# Patient Record
Sex: MALE | Race: WHITE | NOT HISPANIC OR LATINO | Employment: OTHER | URBAN - METROPOLITAN AREA
[De-identification: names, ages, dates, MRNs, and addresses within clinical notes are randomized per-mention and may not be internally consistent; named-entity substitution may affect disease eponyms.]

---

## 2019-04-05 NOTE — PATIENT DISCUSSION
OCT is showing loss rnfl OD but longstanding per pt. Disc would like to follow closely and poss Alph P BID OD with any futher loss of RNFL. IOP is great today.

## 2019-11-12 ENCOUNTER — NEW PATIENT COMPREHENSIVE (OUTPATIENT)
Dept: URBAN - METROPOLITAN AREA CLINIC 36 | Facility: CLINIC | Age: 69
End: 2019-11-12

## 2019-11-12 DIAGNOSIS — H35.362: ICD-10-CM

## 2019-11-12 DIAGNOSIS — H01.006: ICD-10-CM

## 2019-11-12 DIAGNOSIS — H01.003: ICD-10-CM

## 2019-11-12 DIAGNOSIS — H54.7: ICD-10-CM

## 2019-11-12 DIAGNOSIS — H04.123: ICD-10-CM

## 2019-11-12 DIAGNOSIS — H25.812: ICD-10-CM

## 2019-11-12 DIAGNOSIS — H25.811: ICD-10-CM

## 2019-11-12 PROCEDURE — 92134 CPTRZ OPH DX IMG PST SGM RTA: CPT

## 2019-11-12 PROCEDURE — 92015 DETERMINE REFRACTIVE STATE: CPT

## 2019-11-12 PROCEDURE — 99204 OFFICE O/P NEW MOD 45 MIN: CPT

## 2019-11-12 PROCEDURE — 92025NC COMP. CORNEAL TOPO, UNI OR BILAT,

## 2019-11-12 RX ORDER — LIFITEGRAST 50 MG/ML: 1 SOLUTION/ DROPS OPHTHALMIC TWICE A DAY

## 2019-11-12 RX ORDER — ERYTHROMYCIN 5 MG/G: OINTMENT OPHTHALMIC

## 2019-11-12 ASSESSMENT — KERATOMETRY
OD_AXISANGLE_DEGREES: 112
OS_K1POWER_DIOPTERS: 45.75
OD_K2POWER_DIOPTERS: 48.75
OS_AXISANGLE_DEGREES: 69
OD_AXISANGLE2_DEGREES: 22
OS_K2POWER_DIOPTERS: 48.75
OD_K1POWER_DIOPTERS: 45
OS_AXISANGLE2_DEGREES: 159

## 2019-11-12 ASSESSMENT — VISUAL ACUITY
OS_SC: CF 6FT
OU_CC: J12
OU_CC: 20/40-1
OD_CC: J16
OS_BAT: 20/100
OS_CC: 20/40-1
OS_SC: J6
OS_CC: J12
OD_SC: 20/800
OD_SC: CF 3FT
OU_SC: J6
OU_SC: CF 6FT
OD_CC: CF 5FT

## 2019-11-12 ASSESSMENT — TONOMETRY
OS_IOP_MMHG: 20
OD_IOP_MMHG: 21

## 2019-11-19 ENCOUNTER — RETINA CONSULT (OUTPATIENT)
Dept: URBAN - METROPOLITAN AREA CLINIC 36 | Facility: CLINIC | Age: 69
End: 2019-11-19

## 2019-11-19 DIAGNOSIS — H54.7: ICD-10-CM

## 2019-11-19 DIAGNOSIS — H35.3122: ICD-10-CM

## 2019-11-19 DIAGNOSIS — H35.362: ICD-10-CM

## 2019-11-19 DIAGNOSIS — H52.223: ICD-10-CM

## 2019-11-19 DIAGNOSIS — H01.003: ICD-10-CM

## 2019-11-19 DIAGNOSIS — H35.722: ICD-10-CM

## 2019-11-19 DIAGNOSIS — D31.31: ICD-10-CM

## 2019-11-19 DIAGNOSIS — H01.006: ICD-10-CM

## 2019-11-19 DIAGNOSIS — H04.123: ICD-10-CM

## 2019-11-19 PROCEDURE — 92014 COMPRE OPH EXAM EST PT 1/>: CPT

## 2019-11-19 PROCEDURE — 92134 CPTRZ OPH DX IMG PST SGM RTA: CPT

## 2019-11-19 PROCEDURE — 9222550 BILAT EXTENDED OPHTHALMOSCOPY, FIRST

## 2019-11-19 ASSESSMENT — VISUAL ACUITY
OD_CC: CF 3FT
OS_CC: 20/40

## 2019-11-19 ASSESSMENT — TONOMETRY
OS_IOP_MMHG: 23
OD_IOP_MMHG: 23

## 2019-12-04 ENCOUNTER — ESTABLISHED PATIENT (OUTPATIENT)
Dept: URBAN - METROPOLITAN AREA CLINIC 43 | Facility: CLINIC | Age: 69
End: 2019-12-04

## 2019-12-04 DIAGNOSIS — H35.723: ICD-10-CM

## 2019-12-04 DIAGNOSIS — H35.3132: ICD-10-CM

## 2019-12-04 PROCEDURE — 92235 FLUORESCEIN ANGRPH MLTIFRAME: CPT

## 2019-12-04 PROCEDURE — 92012 INTRM OPH EXAM EST PATIENT: CPT

## 2019-12-04 PROCEDURE — 92134 CPTRZ OPH DX IMG PST SGM RTA: CPT

## 2019-12-04 PROCEDURE — 92250 FUNDUS PHOTOGRAPHY W/I&R: CPT

## 2019-12-04 ASSESSMENT — VISUAL ACUITY
OD_CC: CF 3FT
OS_CC: 20/50-2

## 2019-12-04 ASSESSMENT — TONOMETRY
OD_IOP_MMHG: 21
OS_IOP_MMHG: 21

## 2020-09-30 ENCOUNTER — ESTABLISHED PATIENT (OUTPATIENT)
Dept: URBAN - METROPOLITAN AREA CLINIC 36 | Facility: CLINIC | Age: 70
End: 2020-09-30

## 2020-09-30 DIAGNOSIS — H35.30: ICD-10-CM

## 2020-09-30 DIAGNOSIS — D31.31: ICD-10-CM

## 2020-09-30 DIAGNOSIS — H35.723: ICD-10-CM

## 2020-09-30 DIAGNOSIS — H35.3122: ICD-10-CM

## 2020-09-30 DIAGNOSIS — H35.3112: ICD-10-CM

## 2020-09-30 DIAGNOSIS — H35.363: ICD-10-CM

## 2020-09-30 PROCEDURE — 92014 COMPRE OPH EXAM EST PT 1/>: CPT

## 2020-09-30 PROCEDURE — 92235 FLUORESCEIN ANGRPH MLTIFRAME: CPT

## 2020-09-30 PROCEDURE — 92134 CPTRZ OPH DX IMG PST SGM RTA: CPT

## 2020-09-30 PROCEDURE — 92250 FUNDUS PHOTOGRAPHY W/I&R: CPT

## 2020-09-30 ASSESSMENT — TONOMETRY
OS_IOP_MMHG: 23
OD_IOP_MMHG: 23

## 2020-09-30 ASSESSMENT — VISUAL ACUITY
OD_CC: CF 2FT
OS_CC: 20/40-1

## 2020-10-19 NOTE — PATIENT DISCUSSION
Patient educated they are not a candidate for Advanced Vision because of high risk glaucoma suspect.

## 2020-10-19 NOTE — PATIENT DISCUSSION
Patient educated they are not a candidate for Advanced Vision because of amblyopia/high risk glaucoma suspect.

## 2021-10-08 NOTE — PATIENT DISCUSSION
PT STATES HE'S BEEN GETTING RADIATION IN OD THAT ENDED LAST MONTH. PT HAD 19 TREATMENTS. WILL MONITOR W/GEH OR RETINA TO MAKE SURE NO RADIATION RETINOPATHY IS PRESENT.

## 2021-10-08 NOTE — PATIENT DISCUSSION
PLAN: YAG OS ONLY AT THIS TIME, P.O W/GEH. WANTS IT DONE IN NOVEMBER. WILL SIGN CONSENTS DAY OF YAG.

## 2021-11-29 NOTE — PATIENT DISCUSSION
CATARACT SURGERY PLANNED FOR TODAY - OD.
Discussed condition and exacerbating conditions/situations (e.g., dry/arid environments, overhead fans, air conditioners, side effect of medications).
Discussed importance of compliance with ocular medications and follow up exams to prevent loss of vision.
Discussed lid hygiene, warm compress and eyelid wash.
Discussed the importance of blood sugar control in the prevention of ocular complications.
IMPRIMIS.
Monitor yearly for diabetic eye disease.
Monitor.
No Retinal holes, Tears or Detachments.
Patient educated they are not a candidate for Advanced Vision because of amblyopia/high risk glaucoma suspect.
Patient understands condition, prognosis and need for follow up care.
Post op gtt instructions reviewed with patient.
Recommend Basic or Basic plus . Understands may need prism in glasses rx after surgery.
Recommended artificial tears to use: 1 drop 4x a day in both eyes.
Recommended yearly dilated eye examinations.
Retinal tear and detachment warning symptoms reviewed and patient instructed to call immediately if increasing floaters, flashes, or decreasing peripheral vision.
Reviewed natural history.
Stable.
The IOP is in the target range. The patient will continue the current management.
The patient feels that the cataract is significantly impacting daily activities and has elected cataract surgery. The risks, benefits, and alternatives to surgery were discussed. The patient elects to proceed with surgery.
pt denied any diplopia. No prism at this time.
Unknown

## 2021-12-13 NOTE — PROCEDURE NOTE: SURGICAL
<p>Prior to commencing surgery patient identification, surgical procedure, site, and side were confirmed by Dr. David Koenig. Following topical proparacaine anesthesia, the patient was positioned at the YAG laser, a contact lens coupled to the cornea with methylcellulose and an axial posterior capsulotomy performed without complication using 2.9 Mj x 20. Excess methylcellulose was washed from the eye, one drop of Alphagan was instilled and the patient returned to the holding area having tolerated the procedure well and without complication. </p><p>MRN: [id number]</p>

## 2024-04-25 NOTE — PATIENT DISCUSSION
Discussed the risks/benefits of laser capsulotomy. Laser recommended. Patient elects to proceed. You can access the FollowMyHealth Patient Portal offered by [facility] by registering at the following website: [email or web address]. By joining [employer]’s FollowMyHealth portal, you will also be able to view your health information using other applications (apps) compatible with our system.

## 2024-10-21 NOTE — PATIENT DISCUSSION
1 week PO: Patient is doing well post-operatively. The importance of post-op drop compliance was emphasized. Drop scheduled reviewed with patient. Patient to call if any visual changes or concerns. HPI     Glaucoma            Comments: Pt reports for 6 months for IOP. 100% compliant with drop. No   pain or discomfort.           Comments    1. Glaucoma   Glaucoma Laser OD at [place] x early 2015 - no response per patient   2. +DM   3. Corneal FB removed OS 04/07/17   4. PRP OS (vgas 2017, 2022)  5. PCIOL OS W/ Goniotomy 5/7/2020 SN60WF +19.5 (cde 7.60)/istent ?   6. PCIOL OD W/ GONIOTOMY 1/7/2021 (CDE 6.22, SN60WF 19.5)   7. Ptosis repair OU 2/6/23 (Dr Emmanuel Nelson at [facility])      Timolol QAM OU             Last edited by Annabella Lim on 10/21/2024 10:07 AM.            Assessment /Plan     For exam results, see Encounter Report.      ICD-10-CM ICD-9-CM    1. Primary open angle glaucoma of both eyes, mild stage  H40.1131 365.11 Doing well - intraocular pressure is within acceptable range relative to target pressure with no evidence of progression.   Continue current treatment.  Reviewed importance of continued compliance with treatment and follow up.        365.71       2. Ptosis of both eyelids  H02.403 374.30 Follow       3. Dry eye syndrome of both eyes  H04.123 375.15 Follow       4. Type 2 diabetes mellitus without complication, without long-term current use of insulin  E11.9 250.00 Follow       5. Pseudophakia of both eyes  Z96.1 V43.1 Doing well           RETURN TO CLINIC 6 month GOCT, DOA     Timolol QAM OU

## 2024-11-21 NOTE — PATIENT DISCUSSION
- Continue levothyroxine with modified administration instructions  - Take on empty stomach in morning with water, wait 30 minutes before food/other medications  Orders:    levothyroxine 75 mcg tablet; Take 1 tablet (75 mcg total) by mouth every morning Fasting and wait 1/2 hr for coffee, meals or any other medication.     Monitor.

## 2025-06-23 NOTE — PATIENT DISCUSSION
Hepatitis C was again negative so I think we can laid this to rest.  Bad cholesterol is 115 goal is below 100 so watch your saturated fats and increase your walking if you have done all you can with diet and exercise you could consider a statin to decrease your risk for heart attack stroke and peripheral vascular disease please let me know.  Your red blood cells in your urine are present                                                                                                                                                                                                                                                                                                           we can have you drink plenty of fluids and repeat this again or we can have you see the urologist please let me know what you decide.  Your platelets are again elevated and your hemoglobin is again elevated which may be due to your prior history of smoking.  We can continue to monitor this or you can be seen by the hematology oncology doctor.  Finally vitamin D is low so increase to 1000 units a couple of days per week call if you have any other questions or concerns and let us know about the above                                                                                                                                                      No prescription for glasses or contact lenses was given today.